# Patient Record
Sex: MALE | Race: WHITE | NOT HISPANIC OR LATINO | Employment: UNEMPLOYED | URBAN - NONMETROPOLITAN AREA
[De-identification: names, ages, dates, MRNs, and addresses within clinical notes are randomized per-mention and may not be internally consistent; named-entity substitution may affect disease eponyms.]

---

## 2018-04-27 ENCOUNTER — OFFICE VISIT (OUTPATIENT)
Dept: PODIATRY | Facility: CLINIC | Age: 43
End: 2018-04-27

## 2018-04-27 VITALS
WEIGHT: 236.4 LBS | HEART RATE: 63 BPM | HEIGHT: 68 IN | DIASTOLIC BLOOD PRESSURE: 77 MMHG | OXYGEN SATURATION: 98 % | SYSTOLIC BLOOD PRESSURE: 135 MMHG | BODY MASS INDEX: 35.83 KG/M2

## 2018-04-27 DIAGNOSIS — M79.675 PAIN OF TOE OF LEFT FOOT: ICD-10-CM

## 2018-04-27 DIAGNOSIS — L03.032 PARONYCHIA, TOE, LEFT: ICD-10-CM

## 2018-04-27 DIAGNOSIS — L60.0 INGROWN TOENAIL: Primary | ICD-10-CM

## 2018-04-27 PROCEDURE — 99203 OFFICE O/P NEW LOW 30 MIN: CPT | Performed by: PODIATRIST

## 2018-04-27 PROCEDURE — 11750 EXCISION NAIL&NAIL MATRIX: CPT | Performed by: PODIATRIST

## 2018-04-27 RX ORDER — OMEPRAZOLE 20 MG/1
20 CAPSULE, DELAYED RELEASE ORAL DAILY
COMMUNITY

## 2018-04-27 RX ORDER — ROSUVASTATIN CALCIUM 20 MG/1
20 TABLET, COATED ORAL DAILY
COMMUNITY

## 2018-04-27 RX ORDER — SERTRALINE HYDROCHLORIDE 100 MG/1
100 TABLET, FILM COATED ORAL DAILY
COMMUNITY

## 2018-04-27 RX ORDER — OXCARBAZEPINE 150 MG/1
150 TABLET, FILM COATED ORAL 2 TIMES DAILY
COMMUNITY

## 2018-04-27 RX ORDER — MELOXICAM 15 MG/1
15 TABLET ORAL DAILY
COMMUNITY
End: 2020-06-25

## 2018-04-27 RX ORDER — LISINOPRIL 5 MG/1
5 TABLET ORAL DAILY
COMMUNITY

## 2018-04-27 RX ORDER — HYDROCODONE BITARTRATE AND ACETAMINOPHEN 10; 325 MG/1; MG/1
1 TABLET ORAL EVERY 6 HOURS PRN
COMMUNITY
End: 2022-12-15

## 2018-04-27 NOTE — PROGRESS NOTES
Kwame Hines  1975  42 y.o. male    04/27/2018  Chief Complaint   Patient presents with   • Left Foot - Pain           History of Present Illness    Kwame Hines is a 42 y.o. male who presents for evaluation of left foot ingrown toenail.  He states his current episode is been going on for a little over a week.  She was given antibiotics by urgent care which has helped with the pain and redness somewhat.  There is still associated sharp pain with direct pressure.  He states he has had problems with ingrown toenails most of his life.  This is the first time they've become infected however.  He denies any trauma or injuries.  She denies any other previous treatments for this issue.      Past Medical History:   Diagnosis Date   • Arthritis    • Depression    • Hypertension    • Ingrown toenail    • Pain          Past Surgical History:   Procedure Laterality Date   • PARTIAL HIP ARTHROPLASTY      both hips          Family History   Problem Relation Age of Onset   • Cancer Mother    • Cancer Father          Social History     Social History   • Marital status: Unknown     Spouse name: N/A   • Number of children: N/A   • Years of education: N/A     Occupational History   • Not on file.     Social History Main Topics   • Smoking status: Current Every Day Smoker     Packs/day: 2.00     Types: Cigarettes   • Smokeless tobacco: Former User   • Alcohol use No   • Drug use: No   • Sexual activity: Not on file     Other Topics Concern   • Not on file     Social History Narrative   • No narrative on file         Current Outpatient Prescriptions   Medication Sig Dispense Refill   • HYDROcodone-acetaminophen (NORCO)  MG per tablet Take 1 tablet by mouth Every 6 (Six) Hours As Needed for Moderate Pain .     • lisinopril (PRINIVIL,ZESTRIL) 5 MG tablet Take 5 mg by mouth Daily.     • meloxicam (MOBIC) 15 MG tablet Take 15 mg by mouth Daily.     • omeprazole (priLOSEC) 20 MG capsule Take 20 mg by mouth  "Daily.     • OXcarbazepine (TRILEPTAL) 150 MG tablet Take 150 mg by mouth 2 (Two) Times a Day.     • rosuvastatin (CRESTOR) 20 MG tablet Take 20 mg by mouth Daily.     • sertraline (ZOLOFT) 100 MG tablet Take 100 mg by mouth Daily.     • mupirocin (BACTROBAN) 2 % ointment Apply  topically 3 (Three) Times a Day. 30 g 0   • NON FORMULARY Pt doesn't know the medicine he takes and he doesn't have a list     • sulfamethoxazole-trimethoprim (BACTRIM DS,SEPTRA DS) 800-160 MG per tablet Take 1 tablet by mouth 2 (Two) Times a Day. 14 tablet 0     No current facility-administered medications for this visit.          OBJECTIVE    /77   Pulse 63   Ht 172.7 cm (68\")   Wt 107 kg (236 lb 6.4 oz)   SpO2 98%   BMI 35.94 kg/m²       Review of Systems   Constitutional: Negative.    HENT: Negative.    Eyes: Negative.    Respiratory: Negative.    Cardiovascular: Negative.    Gastrointestinal: Negative.    Endocrine: Negative.    Genitourinary: Negative.    Musculoskeletal: Positive for arthralgias and back pain.        Joint pain, foot pain   Skin: Negative.    Allergic/Immunologic: Negative.    Neurological: Negative.    Hematological: Negative.    Psychiatric/Behavioral:        Depression         Physical Exam   Constitutional: he appears well-developed and well-nourished.   CV: No chest pain. Normal RR  Resp: Non labored respirations  Psychiatric: he has a normal mood and affect. her behavior is normal.      Lower Extremity Exam:  Vascular: DP/PT pulses palpable 2+.   left hallux edema  Toes warm  Neuro: Protective sensation intact, b/l.  DTRs intact  Integument: No open wounds.  Ingrown bilateral nail border, left hallux. Mild erythema, edema. +tenderness to palpation.  Web spaces c/d/i  No skin lesions  Musculoskeletal: LE muscle strength 5/5.   Gait normal  Ankle ROM full without pain or crepitus  STJ ROM full without pain or crepitus  No digital deformities      Nail Removal  Date/Time: 4/27/2018 9:06 AM  Performed by: " EMMANUEL LEBLANC  Authorized by: EMMANUEL LEBLANC   Consent: Written consent obtained.  Risks and benefits: risks, benefits and alternatives were discussed  Consent given by: patient  Location: left foot  Location details: left big toe  Anesthesia: digital block    Anesthesia:  Local Anesthetic: lidocaine 2% without epinephrine  Anesthetic total: 5 mL  Preparation: skin prepped with Betadine  Amount removed: partial  Nail removed location: bilateral.  Wedge excision of skin of nail fold: no  Nail bed sutured: no  Nail matrix removed: partial  Removed nail replaced and anchored: no  Dressing: 4x4, antibiotic ointment and gauze roll  Patient tolerance: Patient tolerated the procedure well with no immediate complications  Comments: Matrixectomy with 10% NaOH. Neutralized with acetic acid.                  ASSESSMENT AND PLAN    Kwame was seen today for pain.    Diagnoses and all orders for this visit:    Ingrown toenail    Pain of toe of left foot    Paronychia, toe, left      -Comprehensive foot and ankle exam performed  -Diagnosis, prevention, and treatment of ingrown toe nails discussed with patient, including risks and potential benefits of nail avulsion both temporary and permanent versus simple debridement.  -Pt elected for partial permanent avulsion of left hallux  -Aftercare instructions for soapy munoz soaks BID  -Recheck 2 weeks          This document has been electronically signed by Emmanuel Leblanc DPM on April 27, 2018 9:05 AM     EMR Dragon/Transcription disclaimer:   Much of this encounter note is an electronic transcription/translation of spoken language to printed text. The electronic translation of spoken language may permit erroneous, or at times, nonsensical words or phrases to be inadvertently transcribed; Although I have reviewed the note for such errors, some may still exist.    Emmanuel Leblanc DPM  4/27/2018  9:05 AM

## 2019-03-08 ENCOUNTER — HOSPITAL ENCOUNTER (OUTPATIENT)
Facility: HOSPITAL | Age: 44
Setting detail: HOSPITAL OUTPATIENT SURGERY
End: 2019-03-08
Attending: INTERNAL MEDICINE | Admitting: INTERNAL MEDICINE

## 2019-03-08 ENCOUNTER — OFFICE VISIT (OUTPATIENT)
Dept: GASTROENTEROLOGY | Facility: CLINIC | Age: 44
End: 2019-03-08

## 2019-03-08 VITALS
OXYGEN SATURATION: 98 % | SYSTOLIC BLOOD PRESSURE: 110 MMHG | HEART RATE: 82 BPM | BODY MASS INDEX: 32.07 KG/M2 | HEIGHT: 68 IN | WEIGHT: 211.6 LBS | DIASTOLIC BLOOD PRESSURE: 64 MMHG

## 2019-03-08 DIAGNOSIS — R63.4 WEIGHT LOSS, ABNORMAL: ICD-10-CM

## 2019-03-08 DIAGNOSIS — R10.84 GENERALIZED ABDOMINAL PAIN: Primary | ICD-10-CM

## 2019-03-08 DIAGNOSIS — D50.9 IRON DEFICIENCY ANEMIA, UNSPECIFIED IRON DEFICIENCY ANEMIA TYPE: ICD-10-CM

## 2019-03-08 PROCEDURE — 99214 OFFICE O/P EST MOD 30 MIN: CPT | Performed by: NURSE PRACTITIONER

## 2019-03-08 RX ORDER — ASPIRIN 325 MG
325 TABLET ORAL DAILY
COMMUNITY
End: 2020-06-25

## 2019-03-08 RX ORDER — SODIUM, POTASSIUM,MAG SULFATES 17.5-3.13G
1 SOLUTION, RECONSTITUTED, ORAL ORAL EVERY 12 HOURS
Qty: 2 BOTTLE | Refills: 0 | OUTPATIENT
Start: 2019-03-08 | End: 2020-06-25

## 2019-03-08 RX ORDER — DEXTROSE AND SODIUM CHLORIDE 5; .45 G/100ML; G/100ML
30 INJECTION, SOLUTION INTRAVENOUS CONTINUOUS PRN
Status: CANCELLED | OUTPATIENT
Start: 2019-04-10

## 2019-03-08 NOTE — PROGRESS NOTES
Chief Complaint   Patient presents with   • Anemia   • Weight Loss       Subjective    Kwame Hines is a 43 y.o. male. he is being seen for consultation today at the request of MEREDITH Farrar  43-year-old male presents to discuss weight loss fatigue anemia and intermittent abdominal pain.  States he had hip replacement in January and has lost weight due to decreased appetite since.  Reports about 35 pound weight loss.  He is actually up 2 pounds from office visit with primary care provider this week.  Denies any current abdominal pain though states he will have intermittent cramping pain denies any diarrhea or changes in his bowel habits.  Denies any melena or hematochezia.  He reports nothing sounds good and reports early satiety.  Reports felt like eating yesterday and had a big Mac open small escamilla but that is all he ate for the entire day.  Case reviewed notes hemoglobin of 10.9, hematocrit 33, white blood cell 6.7, platelets 271,000.  States he has never had endoscopic workup or seen GI in the past.    Weight Loss   This is a new (lost 35 pounds in last two months ) problem. The current episode started more than 1 month ago (since hip surgery 1/21/19 ). Associated symptoms include abdominal pain (intermittent ), anorexia, fatigue and weakness. Pertinent negatives include no arthralgias, change in bowel habit, chest pain, chills, congestion, coughing, diaphoresis, fever, headaches, joint swelling, myalgias, nausea, neck pain, numbness, sore throat or vomiting. Associated symptoms comments: Early satiety, gerd . The symptoms are aggravated by eating.     Plan; we will schedule patient for EGD colonoscopy due to early satiety weight loss anemia and intermittent abdominal pain.  Follow-up after test return office sooner if needed.       The following portions of the patient's history were reviewed and updated as appropriate:   Past Medical History:   Diagnosis Date   • Arthritis    • Depression    •  Hypertension    • Ingrown toenail    • Pain      Past Surgical History:   Procedure Laterality Date   • PARTIAL HIP ARTHROPLASTY      both hips      Family History   Problem Relation Age of Onset   • Cancer Mother    • Cancer Father        Prior to Admission medications    Medication Sig Start Date End Date Taking? Authorizing Provider   aspirin 325 MG tablet Take 325 mg by mouth Daily.   Yes Dave Stephens MD   Cyanocobalamin (B-12) 1000 MCG capsule Take  by mouth.   Yes Emergency, Nurse Epic, RN   cyclobenzaprine (FLEXERIL) 5 MG tablet Take 1 tablet by mouth 3 (Three) Times a Day As Needed for Muscle Spasms. 12/19/18  Yes Elbert Edwards MD   HYDROcodone-acetaminophen (NORCO)  MG per tablet Take 1 tablet by mouth Every 6 (Six) Hours As Needed for Moderate Pain .   Yes Dave Stephens MD   lisinopril (PRINIVIL,ZESTRIL) 5 MG tablet Take 5 mg by mouth Daily.   Yes Dave Stephens MD   omeprazole (priLOSEC) 20 MG capsule Take 20 mg by mouth Daily.   Yes Dave Stephens MD   OXcarbazepine (TRILEPTAL) 150 MG tablet Take 150 mg by mouth 2 (Two) Times a Day.   Yes Dave Stephens MD   rosuvastatin (CRESTOR) 20 MG tablet Take 20 mg by mouth Daily.   Yes Dave Stephens MD   sertraline (ZOLOFT) 100 MG tablet Take 100 mg by mouth Daily.   Yes Dave Stephens MD   meloxicam (MOBIC) 15 MG tablet Take 15 mg by mouth Daily.    ProviderDave MD   NON FORMULARY Pt doesn't know the medicine he takes and he doesn't have a list    Emergency, Nurse Epic, RN   raNITIdine (ZANTAC) 150 MG tablet Take 1 tablet by mouth Every Night. 6/3/18 3/8/19  Bong Roth, MEREDITH     No Known Allergies  Social History     Socioeconomic History   • Marital status: Unknown     Spouse name: Not on file   • Number of children: Not on file   • Years of education: Not on file   • Highest education level: Not on file   Tobacco Use   • Smoking status: Former Smoker     Packs/day: 2.00      "Types: Cigarettes   • Smokeless tobacco: Former User   • Tobacco comment: Patient quit smoking Jan 21,2019   Substance and Sexual Activity   • Alcohol use: No   • Drug use: No   • Sexual activity: Defer       Review of Systems  Review of Systems   Constitutional: Positive for fatigue and weight loss. Negative for activity change, appetite change, chills, diaphoresis, fever and unexpected weight change.   HENT: Negative for congestion, sore throat and trouble swallowing.    Respiratory: Negative for cough and shortness of breath.    Cardiovascular: Negative for chest pain.   Gastrointestinal: Positive for abdominal distention (bloating ), abdominal pain (intermittent ), anorexia and constipation (pain medicine ). Negative for anal bleeding, blood in stool, change in bowel habit, diarrhea, nausea, rectal pain and vomiting.   Musculoskeletal: Negative for arthralgias, joint swelling, myalgias and neck pain.   Skin: Negative for pallor.   Neurological: Positive for weakness. Negative for light-headedness, numbness and headaches.        /64 (BP Location: Left arm, Patient Position: Sitting)   Pulse 82   Ht 172.7 cm (68\")   Wt 96 kg (211 lb 9.6 oz)   SpO2 98%   BMI 32.17 kg/m²     Objective    Physical Exam   Constitutional: He is oriented to person, place, and time. He appears well-developed and well-nourished. He is cooperative. No distress.   HENT:   Head: Normocephalic and atraumatic.   Neck: Normal range of motion. Neck supple. No thyromegaly present.   Cardiovascular: Normal rate, regular rhythm and normal heart sounds.   Pulmonary/Chest: Effort normal and breath sounds normal. He has no wheezes. He has no rhonchi. He has no rales.   Abdominal: Soft. Normal appearance and bowel sounds are normal. He exhibits no distension. There is no hepatosplenomegaly. There is no tenderness. There is no rigidity and no guarding. No hernia.   Lymphadenopathy:     He has no cervical adenopathy.   Neurological: He is " alert and oriented to person, place, and time.   Skin: Skin is warm, dry and intact. No rash noted. No pallor.   Psychiatric: He has a normal mood and affect. His speech is normal.     Admission on 08/31/2015, Discharged on 08/31/2015   Component Date Value Ref Range Status   • Protime 08/31/2015 13.3  11.1 - 15.3 seconds Final   • INR 08/31/2015 1.0  0.0 - 3.5 Final   • Lipase 08/31/2015 74  23 - 300 U/L Final   • Amylase 08/31/2015 96  50 - 130 U/L Final   • Sodium 08/31/2015 139  137 - 145 mmol/L Final   • Potassium 08/31/2015 4.0  3.5 - 5.1 mmol/L Final   • Chloride 08/31/2015 98  95 - 110 mmol/L Final   • CO2 08/31/2015 27  22 - 31 mmol/L Final   • Anion Gap 08/31/2015 14.0  5.0 - 15.0 mmol/L Final   • Glucose 08/31/2015 90  60 - 100 mg/dl Final   • BUN 08/31/2015 13  7 - 21 mg/dl Final   • Creatinine 08/31/2015 1.1  0.7 - 1.3 mg/dl Final   • GFR MDRD Non  08/31/2015 74  63 - 147 mL/min/1.73 sq.M Final    Comment: Invalid if creatinine is changing or the patient is on dialysis.  Use AA result if patient is -American, non AA result otherwise.     • GFR MDRD  08/31/2015 89  63 - 147 mL/min/1.73 sq.M Final   • Calcium 08/31/2015 9.6  8.4 - 10.2 mg/dl Final   • Total Protein 08/31/2015 7.6  6.3 - 8.6 gm/dl Final   • Albumin 08/31/2015 4.7  3.4 - 4.8 gm/dl Final   • Total Bilirubin 08/31/2015 0.7  0.2 - 1.3 mg/dl Final   • Alkaline Phosphatase 08/31/2015 41  38 - 126 U/L Final   • AST (SGOT) 08/31/2015 30  17 - 59 U/L Final   • ALT (SGPT) 08/31/2015 51  21 - 72 U/L Final   • WBC 08/31/2015 8.0  3.2 - 9.8 x1000/uL Final   • RBC 08/31/2015 4.87  4.37 - 5.74 charles/mm3 Final   • Hemoglobin 08/31/2015 14.1  13.7 - 17.3 gm/dl Final   • Hematocrit 08/31/2015 42.0  39.0 - 49.0 % Final   • MCV 08/31/2015 86.2  80.0 - 98.0 fl Final   • MCH 08/31/2015 29.0  26.0 - 34.0 pg Final   • MCHC 08/31/2015 33.6  31.5 - 36.3 gm/dl Final   • RDW 08/31/2015 15.4* 11.5 - 14.5 % Final   • Platelets  08/31/2015 135* 150 - 450 x1000/mm3 Final   • MPV 08/31/2015 10.0  8.0 - 12.0 fl Final   • Neutrophil Rel % 08/31/2015 63.9  37.0 - 80.0 % Final   • Lymphocyte Rel % 08/31/2015 28.5  10.0 - 50.0 % Final   • Monocyte Rel % 08/31/2015 6.2  0.0 - 12.0 % Final   • Eosinophil Rel % 08/31/2015 1.1  0.0 - 7.0 % Final   • Basophil Rel % 08/31/2015 0.1  0.0 - 2.0 % Final   • Immature Granulocyte Rel % 08/31/2015 0.20  0.00 - 0.50 % Final   • Neutrophils Absolute 08/31/2015 5.12  2.00 - 8.60 x1000/uL Final   • Lymphocytes Absolute 08/31/2015 2.29  0.60 - 4.20 x1000/uL Final   • Monocytes Absolute 08/31/2015 0.50  0.00 - 0.90 x1000/uL Final   • Eosinophils Absolute 08/31/2015 0.09  0.00 - 0.70 x1000/uL Final   • Basophils Absolute 08/31/2015 0.01  0.00 - 0.20 x1000/uL Final   • Immature Granulocytes Absolute 08/31/2015 0.020  0.005 - 0.022 x1000/uL Final   • Color, UA 08/31/2015 YELLOW   Final   • Appearance 08/31/2015 CLEAR   Final   • Specific Gravity, UA 08/31/2015 1.017  1.003 - 1.030 Final   • pH, UA 08/31/2015 7.0  pH Units Final    Comment: DF by IF @ 08/31/2015 12:09  pH Normal: 5.0 - 9.0      • Leukocytes, UA 08/31/2015 NEGATIVE  NEGATIVE Final   • Nitrite, UA 08/31/2015 NEGATIVE  NEGATIVE Final   • Protein, UA 08/31/2015 NEGATIVE  NEGATIVE Final   • Glucose, Urine 08/31/2015 NEGATIVE  NEGATIVE mg/dl Final   • Ketones, UA 08/31/2015 NEGATIVE  NEGATIVE Final   • Urobilinogen, UA 08/31/2015 1.0* 0.2 EU/dl Final   • Blood, UA 08/31/2015 NEGATIVE  NEGATIVE Final     Assessment/Plan      1. Generalized abdominal pain    2. Weight loss, abnormal    3. Iron deficiency anemia, unspecified iron deficiency anemia type    .       Orders placed during this encounter include:  Orders Placed This Encounter   Procedures   • Follow Anesthesia Guidelines / Standing Orders     Standing Status:   Future       ESOPHAGOGASTRODUODENOSCOPY (N/A), COLONOSCOPY (N/A)    Review and/or summary of lab tests, radiology, procedures, medications.  Review and summary of old records and obtaining of history. The risks and benefits of my recommendations, as well as other treatment options were discussed with the patient today. Questions were answered.    New Medications Ordered This Visit   Medications   • sodium-potassium-magnesium sulfates (SUPREP BOWEL PREP KIT) 17.5-3.13-1.6 GM/177ML solution oral solution     Sig: Take 1 bottle by mouth Every 12 (Twelve) Hours.     Dispense:  2 bottle     Refill:  0       Follow-up: Return in about 4 weeks (around 4/5/2019).          This document has been electronically signed by MEREDITH Paris on March 8, 2019 10:41 AM             Results for orders placed or performed during the hospital encounter of 08/31/15   Urinalysis Without Microscopic   Result Value Ref Range    Color, UA YELLOW     Appearance CLEAR     Specific Gravity, UA 1.017 1.003 - 1.030    pH, UA 7.0 pH Units    Leukocytes, UA NEGATIVE NEGATIVE    Nitrite, UA NEGATIVE NEGATIVE    Protein, UA NEGATIVE NEGATIVE    Glucose, Urine NEGATIVE NEGATIVE mg/dl    Ketones, UA NEGATIVE NEGATIVE    Urobilinogen, UA 1.0 (A) 0.2 EU/dl    Blood, UA NEGATIVE NEGATIVE   Protime-INR   Result Value Ref Range    Protime 13.3 11.1 - 15.3 seconds    INR 1.0 0.0 - 3.5   CBC and Differential   Result Value Ref Range    WBC 8.0 3.2 - 9.8 x1000/uL    RBC 4.87 4.37 - 5.74 charles/mm3    Hemoglobin 14.1 13.7 - 17.3 gm/dl    Hematocrit 42.0 39.0 - 49.0 %    MCV 86.2 80.0 - 98.0 fl    MCH 29.0 26.0 - 34.0 pg    MCHC 33.6 31.5 - 36.3 gm/dl    RDW 15.4 (H) 11.5 - 14.5 %    Platelets 135 (L) 150 - 450 x1000/mm3    MPV 10.0 8.0 - 12.0 fl    Neutrophil Rel % 63.9 37.0 - 80.0 %    Lymphocyte Rel % 28.5 10.0 - 50.0 %    Monocyte Rel % 6.2 0.0 - 12.0 %    Eosinophil Rel % 1.1 0.0 - 7.0 %    Basophil Rel % 0.1 0.0 - 2.0 %    Immature Granulocyte Rel % 0.20 0.00 - 0.50 %    Neutrophils Absolute 5.12 2.00 - 8.60 x1000/uL    Lymphocytes Absolute 2.29 0.60 - 4.20 x1000/uL    Monocytes Absolute 0.50  0.00 - 0.90 x1000/uL    Eosinophils Absolute 0.09 0.00 - 0.70 x1000/uL    Basophils Absolute 0.01 0.00 - 0.20 x1000/uL    Immature Granulocytes Absolute 0.020 0.005 - 0.022 x1000/uL   Lipase   Result Value Ref Range    Lipase 74 23 - 300 U/L   Amylase   Result Value Ref Range    Amylase 96 50 - 130 U/L   Comprehensive metabolic panel   Result Value Ref Range    Sodium 139 137 - 145 mmol/L    Potassium 4.0 3.5 - 5.1 mmol/L    Chloride 98 95 - 110 mmol/L    CO2 27 22 - 31 mmol/L    Anion Gap 14.0 5.0 - 15.0 mmol/L    Glucose 90 60 - 100 mg/dl    BUN 13 7 - 21 mg/dl    Creatinine 1.1 0.7 - 1.3 mg/dl    GFR MDRD Non  74 63 - 147 mL/min/1.73 sq.M    GFR MDRD  89 63 - 147 mL/min/1.73 sq.M    Calcium 9.6 8.4 - 10.2 mg/dl    Total Protein 7.6 6.3 - 8.6 gm/dl    Albumin 4.7 3.4 - 4.8 gm/dl    Total Bilirubin 0.7 0.2 - 1.3 mg/dl    Alkaline Phosphatase 41 38 - 126 U/L    AST (SGOT) 30 17 - 59 U/L    ALT (SGPT) 51 21 - 72 U/L

## 2019-03-08 NOTE — PATIENT INSTRUCTIONS

## 2019-04-08 VITALS — HEIGHT: 68 IN | WEIGHT: 211 LBS | BODY MASS INDEX: 31.98 KG/M2
